# Patient Record
Sex: MALE | Race: BLACK OR AFRICAN AMERICAN | Employment: FULL TIME | URBAN - METROPOLITAN AREA
[De-identification: names, ages, dates, MRNs, and addresses within clinical notes are randomized per-mention and may not be internally consistent; named-entity substitution may affect disease eponyms.]

---

## 2020-04-27 ENCOUNTER — HOSPITAL ENCOUNTER (EMERGENCY)
Facility: CLINIC | Age: 25
Discharge: HOME | End: 2020-04-27
Attending: PHYSICIAN ASSISTANT | Admitting: PHYSICIAN ASSISTANT

## 2020-04-27 VITALS — RESPIRATION RATE: 18 BRPM | HEART RATE: 68 BPM | TEMPERATURE: 96.8 F | OXYGEN SATURATION: 100 %

## 2020-04-27 DIAGNOSIS — H00.14 CHALAZION OF LEFT UPPER EYELID: ICD-10-CM

## 2020-04-27 PROCEDURE — 99282 EMERGENCY DEPT VISIT SF MDM: CPT

## 2020-04-27 ASSESSMENT — ENCOUNTER SYMPTOMS
FACIAL SWELLING: 1
CHILLS: 0
FEVER: 0
EYE DISCHARGE: 1

## 2020-04-27 NOTE — LETTER
April 27, 2020      To Whom It May Concern:      Cornel Sumner was seen in our Emergency Department today, 04/27/20.  I expect his condition to improve over the next day.  He may return to work when improved.    Sincerely,        Chaim Noble PA-C

## 2020-04-27 NOTE — ED AVS SNAPSHOT
[facility] Emergency Department  [address]  [facility] [address]  Phone:  [phone]  Fax:  [phone]                                    Cornel Sumner   MRN: [id number]    Department:  [facility] Emergency Department   Date of Visit:  4/27/2020           After Visit Summary Signature Page    I have received my discharge instructions, and my questions have been answered. I have discussed any challenges I see with this plan with the nurse or doctor.    ..........................................................................................................................................  Patient/Patient Representative Signature      ..........................................................................................................................................  Patient Representative Print Name and Relationship to Patient    ..................................................               ................................................  Date                                   Time    ..........................................................................................................................................  Reviewed by Signature/Title    ...................................................              ..............................................  Date                                               Time          22EPIC Rev 08/18

## 2020-04-27 NOTE — ED PROVIDER NOTES
History     Chief Complaint:  Facial Swelling      HPI   Cornel Sumner is a 24 year old male who presents with facial swelling. Cornel reports an area of swelling to his left eyelid, which he first noticed seven days ago. It seems to have become progressively larger since then, and there has been pus drainage. There seems to be more pustulant drainage in the morning and the eye is usually stuck shut when he awakes. Today, he noticed an area of swelling to the right eyelid too. A few weeks ago, he had similar symptoms which he did not seek evaluation for. There are no vision changes and he denies fevers and chills. For his symptoms, he has been using a warm compress to the area.     Allergies:  No Known Drug Allergies     Medications:    The patient is currently on no regular medications.     Past Medical History:    History reviewed. No pertinent past medical history.     Past Surgical History:    History reviewed. No pertinent past surgical history.     Family History:    History reviewed. No pertinent family history.      Social History:  The patient was alone.    Review of Systems   Constitutional: Negative for chills and fever.   HENT: Positive for facial swelling.    Eyes: Positive for discharge. Negative for visual disturbance.   All other systems reviewed and are negative.      Physical Exam     Patient Vitals for the past 24 hrs:   Temp Temp src Pulse Resp SpO2   04/27/20 1020 96.8  F (36  C) Oral 68 18 100 %       Physical Exam  Constitutional: Pleasant. Cooperative. Non-toxic appearing.  HEENT: Pupils equally round and reactive. No conjunctival injection. No discharge.  Small palpable area to left lateral upper eyelid, non-tender. EOMI. No pain with EOM.   Respiratory: Normal respiratory effort, lungs are clear bilaterally.  Skin: Normal, without rash. No periorbital erythema.  Neurologic: Cranial nerves grossly intact. Alert.  Nursing notes and vital signs reviewed.     Emergency Department Course      Emergency Department Course:  Past medical records, nursing notes, and vitals reviewed.    1030 I performed an exam of the patient as documented above.     1037 Patient rechecked and updated.      Findings and plan explained to the Patient. Patient discharged home with instructions regarding supportive care, medications, and reasons to return. The importance of close follow-up was reviewed.     Impression & Plan     Medical Decision Making:  Cornel Sumner is a 24 year old male who presents for evaluation of a lump on eyelid. This is consistent with chalazion as no TTP suggestive for hordeolum. No vision changes. Will start warm compresses.  No indication for antibiotics as no signs of infectious symptoms, such as conjunctivitis, hordeolum, orbital cellulitis. Supportive cares advised otherwise.  Stable to follow up with opthalmology.  Discussed reasons to return. All questions answered. Patient discharged to home in stable condition.    Diagnosis:    ICD-10-CM    1. Chalazion of left upper eyelid  H00.14        Disposition:  Discharged to home.    Scribe Disclosure:  I, Leanne Hernandez, am serving as a scribe at 10:30 AM on 4/27/2020 to document services personally performed by Chaim Nbole PA-C based on my observations and the provider's statements to me.    4/27/2020   [facility] EMERGENCY DEPARTMENT       Chaim Noble PA-C  04/27/20 1748

## 2020-04-27 NOTE — LETTER
April 27, 2020      To Whom It May Concern:      Cornel Sumner was seen in our Emergency Department today, 04/27/20. He may return to work today.    Sincerely,        THOM PatelN, RN, PHN, MICHELLE, CPEN